# Patient Record
Sex: MALE | Race: WHITE | Employment: FULL TIME | URBAN - METROPOLITAN AREA
[De-identification: names, ages, dates, MRNs, and addresses within clinical notes are randomized per-mention and may not be internally consistent; named-entity substitution may affect disease eponyms.]

---

## 2022-08-15 ENCOUNTER — OFFICE VISIT (OUTPATIENT)
Dept: ORTHOPEDIC SURGERY | Age: 27
End: 2022-08-15
Payer: COMMERCIAL

## 2022-08-15 VITALS — WEIGHT: 290 LBS | BODY MASS INDEX: 35.31 KG/M2 | HEIGHT: 76 IN

## 2022-08-15 DIAGNOSIS — M25.562 CHRONIC PAIN OF LEFT KNEE: ICD-10-CM

## 2022-08-15 DIAGNOSIS — M25.362 PATELLAR INSTABILITY OF LEFT KNEE: Primary | ICD-10-CM

## 2022-08-15 DIAGNOSIS — G89.29 CHRONIC PAIN OF LEFT KNEE: ICD-10-CM

## 2022-08-15 PROCEDURE — 99203 OFFICE O/P NEW LOW 30 MIN: CPT | Performed by: ORTHOPAEDIC SURGERY

## 2022-08-15 RX ORDER — COVID-19 MOLECULAR TEST ASSAY
KIT MISCELLANEOUS
COMMUNITY
Start: 2022-07-01
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
## 2023-10-08 ENCOUNTER — APPOINTMENT (OUTPATIENT)
Dept: GENERAL RADIOLOGY | Age: 28
End: 2023-10-08
Payer: COMMERCIAL

## 2023-10-08 ENCOUNTER — HOSPITAL ENCOUNTER (EMERGENCY)
Age: 28
Discharge: HOME | End: 2023-10-08
Payer: COMMERCIAL

## 2023-10-08 VITALS
BODY MASS INDEX: 36.53 KG/M2 | DIASTOLIC BLOOD PRESSURE: 90 MMHG | TEMPERATURE: 97.6 F | WEIGHT: 300 LBS | SYSTOLIC BLOOD PRESSURE: 138 MMHG | HEART RATE: 95 BPM | HEIGHT: 76 IN | OXYGEN SATURATION: 96 % | RESPIRATION RATE: 16 BRPM

## 2023-10-08 DIAGNOSIS — S63.257A CLOSED DISLOCATION OF LEFT LITTLE FINGER: Primary | ICD-10-CM

## 2023-10-08 PROCEDURE — 73140 X-RAY EXAM OF FINGER(S): CPT

## 2023-10-08 PROCEDURE — 26770 TREAT FINGER DISLOCATION: CPT

## 2023-10-08 PROCEDURE — 99283 EMERGENCY DEPT VISIT LOW MDM: CPT

## 2023-10-08 ASSESSMENT — PAIN SCALES - GENERAL: PAINLEVEL_OUTOF10: 6

## 2023-10-08 ASSESSMENT — ENCOUNTER SYMPTOMS
EYES NEGATIVE: 1
BACK PAIN: 0
DIARRHEA: 0
SHORTNESS OF BREATH: 0
NAUSEA: 0
ABDOMINAL PAIN: 0

## 2023-10-08 ASSESSMENT — LIFESTYLE VARIABLES
HOW MANY STANDARD DRINKS CONTAINING ALCOHOL DO YOU HAVE ON A TYPICAL DAY: 1 OR 2
HOW OFTEN DO YOU HAVE A DRINK CONTAINING ALCOHOL: MONTHLY OR LESS

## 2023-10-08 ASSESSMENT — PAIN DESCRIPTION - ONSET: ONSET: SUDDEN

## 2023-10-08 ASSESSMENT — PAIN DESCRIPTION - FREQUENCY: FREQUENCY: INTERMITTENT

## 2023-10-08 ASSESSMENT — PAIN - FUNCTIONAL ASSESSMENT: PAIN_FUNCTIONAL_ASSESSMENT: 0-10

## 2023-10-08 ASSESSMENT — PAIN DESCRIPTION - LOCATION: LOCATION: HAND

## 2023-10-08 ASSESSMENT — PAIN DESCRIPTION - PAIN TYPE: TYPE: ACUTE PAIN

## 2023-10-08 ASSESSMENT — PAIN DESCRIPTION - ORIENTATION: ORIENTATION: LEFT

## 2023-10-08 NOTE — DISCHARGE INSTRUCTIONS
Take ibuprofen 800 mg every 8 hours for inflammation and pain for the next several days. Keep splint in place until seen by hand surgery. They should follow-up with you early this week. Call tomorrow to schedule an appointment.   Return to the ED for new or worsening symptoms

## 2023-10-08 NOTE — ED PROVIDER NOTES
type: IP  Pre-procedure distal perfusion: normal  Pre-procedure neurological function: normal  Pre-procedure range of motion: reduced  Anesthesia: digital block    Anesthesia:  Local anesthesia used: yes  Local Anesthetic: lidocaine 1% with epinephrine  Immobilization: splint  Splint type: ulnar gutter  Supplies used: Ortho-Glass  Comments: Finger was reduced successfully in an ulnar gutter applied. Right hand surgery. Good anesthesia with three-point digital block at the base of the fifth digit. Middle phalanx dislocation          Orders Placed This Encounter   Procedures    APPLY ULNAR GUTTER SPLINT    XR FINGER LEFT (MIN 2 VIEWS)    [id number] [facility],[address] - [facility] (Hand Surgery)        Medications given during this emergency department visit:  Medications - No data to display    New Prescriptions    No medications on file        History reviewed. No pertinent past medical history. History reviewed. No pertinent surgical history. Social History     Socioeconomic History    Marital status: Single     Spouse name: None    Number of children: None    Years of education: None    Highest education level: None   Tobacco Use    Smoking status: Never    Smokeless tobacco: Never   Substance and Sexual Activity    Alcohol use: Not Currently    Drug use: Never        Previous Medications    No medications on file        Results for orders placed or performed during the hospital encounter of 10/08/23   XR FINGER LEFT (MIN 2 VIEWS)    Narrative    EXAM: Left fifth digit radiographs the    DATE: October 8, 2023    HISTORY: Postreduction    COMPARISON: Same day    TECHNIQUE: 2 radiographs are obtained of the left fifth digit    FINDINGS:    There has been satisfactory reduction of the previously described proximal fifth   interphalangeal joint space dislocation. There could be a very subtle impaction   type fracture associated with the lateral aspect of the distal margin of the   proximal fifth phalanx.       Impression

## 2023-10-08 NOTE — ED NOTES
[facility] placed in F bed related to patients  negative response to pain     Ashly Mcgregor, PRAVIN  10/08/23 [id number]
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
## 2023-10-11 ENCOUNTER — OFFICE VISIT (OUTPATIENT)
Dept: ORTHOPEDIC SURGERY | Age: 28
End: 2023-10-11
Payer: COMMERCIAL

## 2023-10-11 DIAGNOSIS — S63.257A CLOSED DISLOCATION OF LEFT LITTLE FINGER: Primary | ICD-10-CM

## 2023-10-11 PROCEDURE — 99204 OFFICE O/P NEW MOD 45 MIN: CPT | Performed by: NURSE PRACTITIONER

## 2023-10-11 RX ORDER — MELOXICAM 15 MG/1
15 TABLET ORAL DAILY
Qty: 28 TABLET | Refills: 0 | Status: SHIPPED | OUTPATIENT
Start: 2023-10-11 | End: 2023-11-08
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
